# Patient Record
(demographics unavailable — no encounter records)

---

## 2017-07-03 NOTE — RAD
PROCEDURE:  Right Thumb radiographs.



HISTORY:

pain and swelling



COMPARISON:

None.



TECHNIQUE:

AP radiograph of the right hand, as well as spot oblique and lateral 

images of thumb were obtained.



FINDINGS:



RIGHT THUMB:

Normal right thumb, without fracture or focal lesion. Remainder of 

the right hand (as seen on the AP view) grossly unremarkable.



JOINTS:

Normal. 



SOFT TISSUES:

Normal. 



OTHER FINDINGS:

None.



IMPRESSION:

Normal right thumb radiographs.

## 2017-07-03 NOTE — C.PDOC
History Of Present Illness





52 y/o female presents to the ED with complaints of right thumb pain x2 days. 

Pt noticed redness and swelling yesterday, worse today. Pain is throbbing. Pt 

denies injury, fever, weakness, numbness or any other complaints. Pt reports 

history of arthritis, but never been told has gout. 


Time Seen by Provider: 07/03/17 11:06


Chief Complaint (Nursing): Upper Extremity Problem/Injury


History Per: Patient


History/Exam Limitations: no limitations


Onset/Duration Of Symptoms: Days


Current Symptoms Are (Timing): Worse


Quality: Other (throbbing)


Severity: Moderate


Recent travel outside of the [place]: No





Past Medical History


Reviewed: Historical Data, Nursing Documentation, Vital Signs


Vital Signs: 


 Last Vital Signs











Temp  98.7 F   07/03/17 10:46


 


Pulse  83   07/03/17 10:46


 


Resp  18   07/03/17 10:46


 


BP  129/88   07/03/17 10:46


 


Pulse Ox  95   07/03/17 12:08














- Medical History


PMH: Asthma


Family History: States: Unknown Family Hx





- Social History


Hx Tobacco Use: No


Hx Alcohol Use: No


Hx Substance Use: No





- Immunization History


Hx Tetanus Toxoid Vaccination: Yes (2016)


Hx Influenza Vaccination: Yes (2016)


Hx Pneumococcal Vaccination: No





Review Of Systems


Constitutional: Negative for: Fever, Chills


Musculoskeletal: Positive for: Other (right thumb pain, redness and swelling)


Neurological: Negative for: Weakness, Numbness





Physical Exam





- Physical Exam


Appears: Non-toxic, No Acute Distress


Skin: Warm, Dry


Head: Atraumatic, Normacephalic


Eye(s): bilateral: Normal Inspection


Neck: Normal ROM


Chest: Symmetrical


Extremity: Normal ROM, Capillary Refill (<2 seconds), No Deformity, Other (

swelling, tenderness, erythema and tactile warmth to right 1st digit)


Pulses: Right Radial: Normal


Neurological/Psych: Oriented x3, Normal Speech, Normal Motor, Normal Sensation


Gait: Steady





ED Course And Treatment


O2 Sat by Pulse Oximetry: 95 (room air)


Pulse Ox Interpretation: Normal





Medical Decision Making


Medical Decision Making: 


Impression: Right thumb with erythema, swelling, tactile warmth. Thumb is 

tender to touch, skin intact. Based on history and exam, clinically appears to 

be gout, will order Xray to rule out fracture


Plan: 


* toradol


* prednisone


* XR right hand





Progress:


XRay reviewed showing normal thumb. 


Upon reevaluation, patient reports pain is improving. Patient advised to take 

NSAID and follow up with PCP. Instruct on dietary restrictions for uric acid





Disposition


Counseled Patient/Family Regarding: Diagnosis, Need For Followup, Rx Given





- Disposition


Referrals: 


Dev Tarango MD [Staff Provider] - 


Disposition: HOME/ ROUTINE


Disposition Time: 12:03


Condition: STABLE


Additional Instructions: 


[place] diagnstico de hoy es Gout


[place] Prednisone diariamente para la inflamacin


[place] Advil, Aleve o Ibuprofen para el dolor cada 8 horas


Siga con [facility] mdico para abbi evaluacin ms detallada


Prescriptions: 


predniSONE [predniSONE Tab] 2 tab PO DAILY #10 tab


Instructions:  Gout (ED)


Print Language: Kinyarwanda





- POA


Present On Arrival: None





- Clinical Impression


Clinical Impression: 


 Gout








- PA / NP / Resident Statement


MD/DO has reviewed & agrees with the documentation as recorded.





- Scribe Statement


The provider has reviewed the documentation as recorded by the Юлия Cadet





All medical record entries made by the Юлия were at my direction and 

personally dictated by me. I have reviewed the chart and agree that the record 

accurately reflects my personal performance of the history, physical exam, 

medical decision making, and the department course for this patient. I have 

also personally directed, reviewed, and agree with the discharge instructions 

and disposition.

## 2017-07-09 NOTE — C.PDOC
History Of Present Illness


Patient presents to the ER with a complaint of a sharp, stabbing left flank 

pain radiating to the left leg that began approximately at 21:00. Patient 

states she took prednisone at home but vomited afterwards. Patient appears; 

denies fever, chills, dysuria, or hematuria.


Time Seen by Provider: 07/09/17 02:21


Chief Complaint (Nursing): Lower Extremity Problem/Injury


History Per: Patient


History/Exam Limitations: no limitations


Onset/Duration Of Symptoms: Hrs


Current Symptoms Are (Timing): Still Present


Severity: Moderate


Pain Scale Rating Of: 4


Recent travel outside of the [place]: No





Past Medical History


Reviewed: Historical Data, Nursing Documentation, Vital Signs


Vital Signs: 


 Last Vital Signs











Temp  98.9 F   07/09/17 02:13


 


Pulse  88   07/09/17 02:13


 


Resp  18   07/09/17 02:13


 


BP  145/83   07/09/17 02:13


 


Pulse Ox  99   07/09/17 02:56














- Medical History


PMH: Asthma


Surgical History: No Surg Hx


Family History: States: No Known Family Hx





- Social History


Hx Tobacco Use: No


Hx Alcohol Use: No


Hx Substance Use: No





- Immunization History


Hx Tetanus Toxoid Vaccination: Yes


Hx Influenza Vaccination: Yes


Hx Pneumococcal Vaccination: No





Review Of Systems


Constitutional: Negative for: Fever, Chills


Genitourinary: Negative for: Dysuria, Hematuria


Musculoskeletal: Positive for: Leg Pain (Radiating from left flank), Other (

Left Flank Pain)


Psych: Positive for: Anxiety





Physical Exam





- Physical Exam


Appears: Non-toxic


Skin: Warm, Dry


Head: Normacephalic


Eye(s): bilateral: Normal Inspection


Oral Mucosa: Moist


Neck: Supple


Chest: Symmetrical, No Tenderness


Cardiovascular: Rhythm Regular, No Murmur


Respiratory: No Rales, No Rhonchi, No Wheezing


Gastrointestinal/Abdominal: Soft, Tenderness (LLQ), No Distention, No Guarding, 

No Rebound


Back: Normal Inspection, No CVA Tenderness, Muscle Spasm


Extremity: Normal ROM (x4), No Tenderness


Extremity: Bilateral: Atraumatic, Normal Color And Temperature, Normal ROM


Pulses: Left Femoral: Normal, Right Femoral: Normal, Left Dorsalis Pedis: Normal

, Right Dorsalis Pedis: Normal


Neurological/Psych: Oriented x3


Gait: Steady





ED Course And Treatment





- Laboratory Results


Result Diagrams: 


 07/09/17 02:37





 07/09/17 02:37


O2 Sat by Pulse Oximetry: 99 (Room air)


Pulse Ox Interpretation: Normal


Progress Note: Blood work and urinalysis ordered. Pepcid, toradol, morphine, 

zofran, and IV fluids administered.


Reevaluation Time: 04:52


Reassessment Condition: Improved





Medical Decision Making


Medical Decision Making: 





Upon provider reevaluation patient is feeling better, is medically stable, and 

requires no further treatment in the ED at this time. Patient will be 

discharged home with Rx for tramadol and zofran . Counseling was provided and 

all questions were answered regarding diagnosis and need for follow up with dr tarango. There is agreement to discharge plan. Return if symptoms persist or 

worsen.





Disposition


Counseled Patient/Family Regarding: Studies Performed, Diagnosis, Need For 

Followup, Rx Given





- Disposition


Referrals: 


Dev Tarango MD [Staff Provider] - 


Disposition: HOME/ ROUTINE


Disposition Time: 02:21


Condition: FAIR


Additional Instructions: 


Please return if symptoms recur


Prescriptions: 


Ondansetron ODT [Zofran ODT] 1 odt PO BID PRN #12 odt


 PRN Reason: Nausea/Vomiting


traMADol [Ultram] 50 mg PO TID PRN #15 tab


 PRN Reason: Pain, Severe (8-10)


Instructions:  Abdominal Pain (ED), Leg Pain (ED)


Print Language: Andorran





- Clinical Impression


Clinical Impression: 


 Abdominal pain, Leg pain








- Scribe Statement


The provider has reviewed the documentation as recorded by the Scriblaura Roberts





All medical record entries made by the Scribe were at my direction and 

personally dictated by me. I have reviewed the chart and agree that the record 

accurately reflects my personal performance of the history, physical exam, 

medical decision making, and the department course for this patient. I have 

also personally directed, reviewed, and agree with the discharge instructions 

and disposition.

## 2017-07-09 NOTE — CT
EXAM:

  CT Abdomen and Pelvis Without Intravenous Contrast



CLINICAL HISTORY:

  53 years old, female; Pain; Abdominal pain; Flank; Left; Prior surgery; 

Surgery date: 6+ months; Surgery type: ; Additional info: Left flank 

pain



TECHNIQUE:

  Axial computed tomography images of the abdomen and pelvis without 

intravenous contrast.  This CT exam was performed using one or more of the 

following dose reduction techniques:  automated exposure control, adjustment of 

the mA and/or kV according to patient size, and/or use of iterative 

reconstruction technique.

  Coronal and sagittal reformatted images were created and reviewed.



EXAM DATE/TIME:

  2017 3:12 AM



COMPARISON:

  No relevant prior studies available.



FINDINGS:



  There is a rounded 2.5 cm area of low attenuation in the right hepatic lobe 

on image 35 series 3 incompletely characterized on noncontrast study. Further 

evaluation with  ultrasound or CT with contrast is recommended (could be done 

nonemergent basis).



  The gallbladder, spleen, and pancreas appear grossly normal on this 

non-contrast study. 



  No perinephric stranding. No hydronephrosis. No obstructing calculi. 



   Scattered colonic diverticuli are noted.



   A normal appendix is identified coronal images 39 through 46.



  Hyperdensity along uterine fundus that could be either calcification versus 

clip.





IMPRESSION:   



  No acute findings.



  Low-attenuation right hepatic lesion for which followup is recommended.